# Patient Record
Sex: FEMALE | URBAN - METROPOLITAN AREA
[De-identification: names, ages, dates, MRNs, and addresses within clinical notes are randomized per-mention and may not be internally consistent; named-entity substitution may affect disease eponyms.]

---

## 2024-04-11 ENCOUNTER — ORDER TRANSCRIPTION (OUTPATIENT)
Dept: PHYSICAL THERAPY | Facility: HOSPITAL | Age: 59
End: 2024-04-11

## 2024-04-11 DIAGNOSIS — M54.30 SCIATICA: Primary | ICD-10-CM

## 2024-04-24 ENCOUNTER — TELEPHONE (OUTPATIENT)
Dept: PHYSICAL THERAPY | Facility: HOSPITAL | Age: 59
End: 2024-04-24

## 2024-04-29 ENCOUNTER — TELEPHONE (OUTPATIENT)
Dept: PHYSICAL THERAPY | Facility: HOSPITAL | Age: 59
End: 2024-04-29

## 2024-04-29 NOTE — PROGRESS NOTES
SPINE EVALUATION:     Diagnosis:   Sciatica (M54.30)      Referring Provider: Elsa Torres  Date of Evaluation:    4/29/2024    Precautions:  {PT_PRECAUTIONS:564::\"None\"} Next MD visit:   none scheduled  Date of Surgery: n/a     PATIENT SUMMARY   Rolly Mcnair is a 59 year old female who presents to therapy today with complaints of ***  Pt describes pain level current ***/10, at best ***/10, at worst ***/10.   Current functional limitations include ***.     Rolly describes prior level of function ***. Pt goals include ***.  Past medical history was reviewed with Rolly. Significant findings include  has no past medical history on file.    Pt denies diplopia, dysarthria, dysphasia, dizziness, drop attacks, bowel/bladder changes, saddle anesthesia, and JEANNIE LE N/T.    ASSESSMENT  Rolly presents to physical therapy evaluation with primary c/o ***. The results of the objective tests and measures show ***.  Functional deficits include but are not limited to ***.  Signs and symptoms are consistent with diagnosis of ***. Pt and PT discussed evaluation findings, pathology, POC and HEP.  Pt voiced understanding and performs HEP correctly without reported pain. Skilled Physical Therapy is medically necessary to address the above impairments and reach functional goals.     OBJECTIVE:   Observation/Posture: ***  Gait: ***coordinated gait  Neuro Screen: *** LE's intact to light touch all dermatomes    ***Denies changes in bowel/bladder function                Reflexes:  Patellar (L3-4)                     Achilles (S1-2)    Lumbar ROM: (degrees)  Flexion:          fingertips to  ***   Extension  ***      Right               Left   Sidebend  ***        ***     Rotation  ***       Hip Screen:    Hip ROM:  ER  R: *** deg L *** deg         IR   R: *** deg L *** deg   Hip Quadrant: flex/add/comp:  Functional:   Single Leg Stance Balance:  Right: *** sec   Left: *** sec   Standing squat: ***   Step Test: ***  Accessory  Motion: ***   Palpation: ***    Strength/MMT:       Right  Left   Hip flexion (L1,2,3)  ***/5  ***/5   Knee Extension (L3, L4)  ***/5  ***/5   Ankle DF/inversion (L4, L5)  ***/5  ***/5   Great Toe Extension (L5)  ***/5  ***/5   Ankle Eversion (S1)  ***/5  ***/5     Flexibility:   Hamstrings 90/90: R *** deg L *** deg   Piriformis: *** restiction  Special Tests:   SLR: R (***)  L (***)    Further Sensitization:  Dorsiflexion:       Internal Rotation:       Adduction:   Slump: R (***)   L (***)   Passive Neck Flexion Test: (***) for LBP or LE pain   Mona's: R (***);   L (***)   Lumbar Quadrant Standing (Ext, rot, SB)    Prone Instability Test:   Lateral Shift Test:   Calf Strength Test (S1, S2)  6x:   Lumbar Compression/Distraction Sitting:   Prone Knee Bend (L1,2,3,4 tension):   Prone Knee Bend in sidelying:    Cord Compression/MVC: Babinski                    Klonus 3x minimum   SI Provocation Tests: ***/6    Distraction:    Thigh Thrust:    Gaenslen's: R ***; L ***    Compression Sidelying:    Sacral Thrust:    (Palpation posterior SI ligament)   Ligamentous Laxity Scale: ***/9    Passive hyperextension elbow > 10 deg    Passive hyperextension 5th finger > 90 deg     Passive abduction of thumb to contact forearm    Passive knee hyperextension > 10 deg    Trunk flexion to hands flat on floor            Today’s Treatment and Response:   Pt education was provided on exam findings, treatment diagnosis, treatment plan, expectations, and prognosis. Pt was also provided recommendations for {pt/ot/slp education:8354}  Patient was instructed in and issued a HEP for: ***    Charges: PT Eval {LOW/MODERATE/HIGH COMPLEXITY:4314}, ***      Total Timed Treatment: *** min     Total Treatment Time: *** min     Based on clinical rationale and outcome measures, this evaluation involved {LOW/MODERATE/HIGH COMPLEXITY:5068} decision making due to {1-2, 3+:8580} personal factors/comorbidities, {3, 4+:7228} body structures  involved/activity limitations, and {Evolving/Unstable:7229} symptoms including {Vital sign response/changing pain levels:7230}.  PLAN OF CARE:    Goals: (to be met in *** visits)   (I) with HEP  Demonstrate proper body mechanics with functional squat and without c/o pain.  Improved lumbar flexion to enable don/doff socks and shoes without complaints.   Able to tolerate activities in *** with decreased pain to ***.  Able to sleep uninterrupted from pain.   Radicular symptoms abolished with all ADL's.  Pt to return to work without restriction.   Demonstrate proper body mechanics while lifting ***lbs floor to waist to help prevent re-injury.       Frequency / Duration: Patient will be seen for *** x/week or a total of *** visits over a 90 day period. Treatment will include: {Ortho Interventions:7069}    Education or treatment limitation: None  Rehab Potential:good    {Pre and Post Scores:65025}    Patient/Family/Caregiver was advised of these findings, precautions, and treatment options and has agreed to actively participate in planning and for this course of care.    Thank you for your referral. Please co-sign or sign and return this letter via fax as soon as possible to [phone]. If you have any questions, please contact me at Dept: [phone]    Sincerely,  Electronically signed by therapist: Elsa Bowman, PT    Physician's certification required: Yes  I certify the need for these services furnished under this plan of treatment and while under my care.    X___________________________________________________ Date____________________    Certification From: 4/29/2024  To:7/28/2024

## 2024-04-30 ENCOUNTER — APPOINTMENT (OUTPATIENT)
Dept: PHYSICAL THERAPY | Age: 59
End: 2024-04-30
Attending: FAMILY MEDICINE
Payer: COMMERCIAL

## 2024-05-09 ENCOUNTER — APPOINTMENT (OUTPATIENT)
Dept: PHYSICAL THERAPY | Age: 59
End: 2024-05-09
Attending: FAMILY MEDICINE
Payer: COMMERCIAL

## 2024-05-13 ENCOUNTER — APPOINTMENT (OUTPATIENT)
Dept: PHYSICAL THERAPY | Age: 59
End: 2024-05-13
Attending: FAMILY MEDICINE
Payer: COMMERCIAL

## 2024-05-15 ENCOUNTER — APPOINTMENT (OUTPATIENT)
Dept: PHYSICAL THERAPY | Age: 59
End: 2024-05-15
Attending: FAMILY MEDICINE
Payer: COMMERCIAL

## 2024-05-20 ENCOUNTER — APPOINTMENT (OUTPATIENT)
Dept: PHYSICAL THERAPY | Age: 59
End: 2024-05-20
Attending: FAMILY MEDICINE
Payer: COMMERCIAL

## 2024-11-07 ENCOUNTER — HOSPITAL ENCOUNTER (EMERGENCY)
Facility: HOSPITAL | Age: 59
Discharge: HOME | End: 2024-11-08
Attending: EMERGENCY MEDICINE
Payer: COMMERCIAL

## 2024-11-07 ENCOUNTER — APPOINTMENT (OUTPATIENT)
Dept: GENERAL RADIOLOGY | Facility: HOSPITAL | Age: 59
End: 2024-11-07
Payer: COMMERCIAL

## 2024-11-07 ENCOUNTER — APPOINTMENT (OUTPATIENT)
Dept: CT IMAGING | Facility: HOSPITAL | Age: 59
End: 2024-11-07
Attending: EMERGENCY MEDICINE
Payer: COMMERCIAL

## 2024-11-07 DIAGNOSIS — R19.7 NAUSEA VOMITING AND DIARRHEA: Primary | ICD-10-CM

## 2024-11-07 DIAGNOSIS — N28.9 LESION OF LEFT NATIVE KIDNEY: ICD-10-CM

## 2024-11-07 DIAGNOSIS — R11.2 NAUSEA VOMITING AND DIARRHEA: Primary | ICD-10-CM

## 2024-11-07 LAB
ALBUMIN SERPL-MCNC: 4.4 G/DL (ref 3.2–4.8)
ALBUMIN/GLOB SERPL: 1.2 {RATIO} (ref 1–2)
ALP LIVER SERPL-CCNC: 142 U/L
ALT SERPL-CCNC: 34 U/L
ANION GAP SERPL CALC-SCNC: 5 MMOL/L (ref 0–18)
AST SERPL-CCNC: 29 U/L (ref ?–34)
BASOPHILS # BLD AUTO: 0.07 X10(3) UL (ref 0–0.2)
BASOPHILS NFR BLD AUTO: 0.9 %
BILIRUB SERPL-MCNC: 0.2 MG/DL (ref 0.3–1.2)
BUN BLD-MCNC: 19 MG/DL (ref 9–23)
CALCIUM BLD-MCNC: 9.9 MG/DL (ref 8.7–10.4)
CHLORIDE SERPL-SCNC: 104 MMOL/L (ref 98–112)
CO2 SERPL-SCNC: 29 MMOL/L (ref 21–32)
CREAT BLD-MCNC: 1.09 MG/DL
EGFRCR SERPLBLD CKD-EPI 2021: 59 ML/MIN/1.73M2 (ref 60–?)
EOSINOPHIL # BLD AUTO: 0.26 X10(3) UL (ref 0–0.7)
EOSINOPHIL NFR BLD AUTO: 3.3 %
ERYTHROCYTE [DISTWIDTH] IN BLOOD BY AUTOMATED COUNT: 13.3 %
GLOBULIN PLAS-MCNC: 3.7 G/DL (ref 2–3.5)
GLUCOSE BLD-MCNC: 111 MG/DL (ref 70–99)
HCT VFR BLD AUTO: 36.2 %
HGB BLD-MCNC: 12.2 G/DL
IMM GRANULOCYTES # BLD AUTO: 0.03 X10(3) UL (ref 0–1)
IMM GRANULOCYTES NFR BLD: 0.4 %
LIPASE SERPL-CCNC: 117 U/L (ref 12–53)
LYMPHOCYTES # BLD AUTO: 3.09 X10(3) UL (ref 1–4)
LYMPHOCYTES NFR BLD AUTO: 39.1 %
MCH RBC QN AUTO: 29 PG (ref 26–34)
MCHC RBC AUTO-ENTMCNC: 33.7 G/DL (ref 31–37)
MCV RBC AUTO: 86.2 FL
MONOCYTES # BLD AUTO: 0.62 X10(3) UL (ref 0.1–1)
MONOCYTES NFR BLD AUTO: 7.8 %
NEUTROPHILS # BLD AUTO: 3.84 X10 (3) UL (ref 1.5–7.7)
NEUTROPHILS # BLD AUTO: 3.84 X10(3) UL (ref 1.5–7.7)
NEUTROPHILS NFR BLD AUTO: 48.5 %
OSMOLALITY SERPL CALC.SUM OF ELEC: 289 MOSM/KG (ref 275–295)
PLATELET # BLD AUTO: 315 10(3)UL (ref 150–450)
POTASSIUM SERPL-SCNC: 3.5 MMOL/L (ref 3.5–5.1)
PROT SERPL-MCNC: 8.1 G/DL (ref 5.7–8.2)
RBC # BLD AUTO: 4.2 X10(6)UL
SODIUM SERPL-SCNC: 138 MMOL/L (ref 136–145)
TROPONIN I SERPL HS-MCNC: <3 NG/L
WBC # BLD AUTO: 7.9 X10(3) UL (ref 4–11)

## 2024-11-07 PROCEDURE — 85025 COMPLETE CBC W/AUTO DIFF WBC: CPT | Performed by: EMERGENCY MEDICINE

## 2024-11-07 PROCEDURE — 84484 ASSAY OF TROPONIN QUANT: CPT | Performed by: EMERGENCY MEDICINE

## 2024-11-07 PROCEDURE — 83690 ASSAY OF LIPASE: CPT | Performed by: EMERGENCY MEDICINE

## 2024-11-07 PROCEDURE — 71046 X-RAY EXAM CHEST 2 VIEWS: CPT

## 2024-11-07 PROCEDURE — 96361 HYDRATE IV INFUSION ADD-ON: CPT

## 2024-11-07 PROCEDURE — 80053 COMPREHEN METABOLIC PANEL: CPT | Performed by: EMERGENCY MEDICINE

## 2024-11-07 PROCEDURE — 93005 ELECTROCARDIOGRAM TRACING: CPT

## 2024-11-07 PROCEDURE — 99285 EMERGENCY DEPT VISIT HI MDM: CPT

## 2024-11-07 PROCEDURE — 96374 THER/PROPH/DIAG INJ IV PUSH: CPT

## 2024-11-07 PROCEDURE — 93010 ELECTROCARDIOGRAM REPORT: CPT

## 2024-11-07 PROCEDURE — 74177 CT ABD & PELVIS W/CONTRAST: CPT | Performed by: EMERGENCY MEDICINE

## 2024-11-07 PROCEDURE — 96375 TX/PRO/DX INJ NEW DRUG ADDON: CPT

## 2024-11-07 RX ORDER — KETOROLAC TROMETHAMINE 15 MG/ML
15 INJECTION, SOLUTION INTRAMUSCULAR; INTRAVENOUS ONCE
Status: COMPLETED | OUTPATIENT
Start: 2024-11-07 | End: 2024-11-07

## 2024-11-07 RX ORDER — ONDANSETRON 2 MG/ML
4 INJECTION INTRAMUSCULAR; INTRAVENOUS ONCE
Status: COMPLETED | OUTPATIENT
Start: 2024-11-07 | End: 2024-11-07

## 2024-11-07 RX ORDER — TELMISARTAN AND HYDROCHLORTHIAZIDE 40; 12.5 MG/1; MG/1
1 TABLET ORAL DAILY
COMMUNITY

## 2024-11-07 RX ORDER — DAPAGLIFLOZIN 5 MG/1
5 TABLET, FILM COATED ORAL DAILY
COMMUNITY

## 2024-11-07 RX ORDER — EFAVIRENZ, LAMIVUDINE AND TENOFOVIR DISOPROXIL FUMARATE 400; 300; 300 MG/1; MG/1; MG/1
TABLET, FILM COATED ORAL NIGHTLY
COMMUNITY

## 2024-11-07 RX ORDER — ATORVASTATIN CALCIUM 10 MG/1
10 TABLET, FILM COATED ORAL NIGHTLY
COMMUNITY

## 2024-11-08 VITALS
RESPIRATION RATE: 16 BRPM | OXYGEN SATURATION: 99 % | SYSTOLIC BLOOD PRESSURE: 125 MMHG | BODY MASS INDEX: 25.97 KG/M2 | HEIGHT: 62 IN | HEART RATE: 72 BPM | TEMPERATURE: 98 F | WEIGHT: 141.13 LBS | DIASTOLIC BLOOD PRESSURE: 75 MMHG

## 2024-11-08 LAB
ATRIAL RATE: 85 BPM
P AXIS: 65 DEGREES
P-R INTERVAL: 150 MS
Q-T INTERVAL: 362 MS
QRS DURATION: 74 MS
QTC CALCULATION (BEZET): 430 MS
R AXIS: 54 DEGREES
T AXIS: 53 DEGREES
VENTRICULAR RATE: 85 BPM

## 2024-11-08 RX ORDER — ONDANSETRON 4 MG/1
4 TABLET, ORALLY DISINTEGRATING ORAL EVERY 4 HOURS PRN
Qty: 10 TABLET | Refills: 0 | Status: SHIPPED | OUTPATIENT
Start: 2024-11-08 | End: 2024-11-15

## 2024-11-08 RX ORDER — ONDANSETRON 4 MG/1
4 TABLET, ORALLY DISINTEGRATING ORAL EVERY 4 HOURS PRN
Qty: 10 TABLET | Refills: 0 | Status: SHIPPED | OUTPATIENT
Start: 2024-11-08 | End: 2024-11-08

## 2024-11-08 NOTE — ED INITIAL ASSESSMENT (HPI)
Pt here with c/o abd pain, more to the LLQ radiating up to the chest area for 4 days with n/v. She denies problems with bowel and bladder. Reports occas MAGGIE

## 2024-11-08 NOTE — ED PROVIDER NOTES
Patient Seen in: [facility] Emergency Department      History     Chief Complaint   Patient presents with    Abdomen/Flank Pain     Stated Complaint: chest pain and abd pain    Subjective:   HPI      This is a 59-year-old female past medical history of diabetes, hypertension who presents with abdominal pain lower quadrant abdominal pain for the last 4 to 5 days which describes as constant.  Is been associated with nausea, vomiting and diarrhea.  She has had about 2 episodes of vomiting and about 4 episodes of diarrhea today and 5 yesterday.  She states occasionally radiates up into the epigastric area and into her chest on the left side.  No sick contacts.  No fevers.  No recent travel.  No recent antibiotics.  She states she saw her primary care physician today who sent her to the emergency room.    Objective:     No pertinent past medical history.            No pertinent past surgical history.              No pertinent social history.                Physical Exam     ED Triage Vitals [11/07/24 2033]   /76   Pulse 83   Resp 20   Temp 98 °F (36.7 °C)   Temp src Temporal   SpO2 97 %   O2 Device None (Room air)       Current Vitals:   Vital Signs  BP: 125/75  Pulse: 72  Resp: 16  Temp: 98 °F (36.7 °C)  Temp src: Temporal  MAP (mmHg): 90    Oxygen Therapy  SpO2: 99 %  O2 Device: None (Room air)        Physical Exam  GENERAL: Awake, alert oriented x3, nontoxic appearing.   SKIN: Normal, warm, and dry.  HEENT:  Pupils equally round and reactive to light. Conjuctiva clear.  Oropharynx is clear and moist.   Lungs: Clear to auscultation bilaterally with no rales, no retractions, and no wheezing.  HEART:  Regular rate and rhythm. S1 and S2. No murmurs, no rubs or gallops.   ABDOMEN: Soft, nontender and nondistended. Normoactive bowel sounds. No rebound. No guarding.   EXTREMITIES: Warm with brisk capillary refill.       ED Course     Labs Reviewed   COMP METABOLIC PANEL (14) - Abnormal; Notable for the following  components:       Result Value    Glucose 111 (*)     Creatinine 1.09 (*)     eGFR-Cr 59 (*)     Alkaline Phosphatase 142 (*)     Bilirubin, Total 0.2 (*)     Globulin  3.7 (*)     All other components within normal limits   LIPASE - Abnormal; Notable for the following components:    Lipase 117 (*)     All other components within normal limits   TROPONIN I HIGH SENSITIVITY - Normal   CBC WITH DIFFERENTIAL WITH PLATELET   RAINBOW DRAW BLUE     EKG    Rate, intervals and axes as noted on EKG Report.  Rate: 85  Rhythm: Sinus Rhythm  Reading: No acute changes.                CT ABDOMEN+PELVIS(CONTRAST ONLY)(CPT=74177)    Result Date: 11/7/2024  PROCEDURE:  CT ABDOMEN+PELVIS (CONTRAST ONLY) (CPT=74177)  COMPARISON:  None.  INDICATIONS:  chest pain and abd pain  TECHNIQUE:  CT scanning was performed from the dome of the diaphragm to the pubic symphysis with non-ionic intravenous contrast material. Post contrast coronal MPR imaging was performed.  Dose reduction techniques were used. Dose information is transmitted to the ACR (American College of Radiology) NRDR (National Radiology Data Registry) which includes the Dose Index Registry.  PATIENT STATED HISTORY:(As transcribed by Technologist)  Left lower quadrant pain for 4 days   CONTRAST USED:  80cc of Isovue 370  FINDINGS:  LIVER:  No enlargement, atrophy, abnormal density, or significant focal lesion.  BILIARY:  No visible dilatation or calcification.  PANCREAS:  No lesion, fluid collection, ductal dilatation, or atrophy.  SPLEEN:  No enlargement or focal lesion.  KIDNEYS:  Indeterminate soft tissue attenuation 1.2 cm exophytic lesion within the left lower pole.  No hydronephrosis or nephrolithiasis bilaterally. ADRENALS:  No mass or enlargement.  AORTA/VASCULAR:  No aneurysm or dissection.  RETROPERITONEUM:  No mass or adenopathy.  BOWEL/MESENTERY:  No dilated bowel or wall thickening.  Appendix is not visualized. ABDOMINAL WALL:  No mass or hernia.  URINARY BLADDER:   No visible focal wall thickening, lesion, or calculus.  PELVIC NODES:  No adenopathy.  PELVIC ORGANS:  Hysterectomy BONES:  No bony lesion or fracture.  LUNG BASES:  No visible pulmonary or pleural disease.  OTHER:  Negative.             CONCLUSION:  1. No acute abnormality in the abdomen or pelvis. 2. Indeterminate 1.2 centimeters soft tissue attenuation left lower pole renal lesion.  This may represent a hemorrhagic/proteinaceous cyst though enhancing neoplasm is not excluded, further evaluation with contrast enhanced renal MRI is recommended.   LOCATION:  [facility]   Dictated by (CST): Dani Godoy MD on 11/07/2024 at 10:39 PM     Finalized by (CST): Dani Godoy MD on 11/07/2024 at 10:43 PM       XR CHEST PA + LAT CHEST (CPT=71046)    Result Date: 11/7/2024  PROCEDURE:  XR CHEST PA + LAT CHEST (CPT=71046)  INDICATIONS:  chest pain and abd pain  COMPARISON:  None.  TECHNIQUE:  PA and lateral chest radiographs were obtained.  PATIENT STATED HISTORY: (As transcribed by Technologist)  Patient states swelling and discomfort along left anterior and central anterior chest and left groin with shortness of breath x4 days.    FINDINGS:  LUNGS:  No focal consolidation.  Normal vascularity. CARDIAC:  Normal size cardiac silhouette. MEDIASTINUM:  Normal. PLEURA:  Normal.  No pleural effusions. BONES:  Mild degenerative changes of the spine.            CONCLUSION:  No acute cardiopulmonary findings.   LOCATION:  [facility]   Dictated by (CST): Dani Godoy MD on 11/07/2024 at 8:59 PM     Finalized by (CST): Dani Godoy MD on 11/07/2024 at 8:59 PM            MDM              This is a 59-year-old female past medical history of diabetes, hypertension who presents with abdominal pain lower quadrant abdominal pain for the last 4 to 5 days which describes as constant.  Differential includes viral diarrhea, colitis, diverticulitis.      Patient placed on cardiac monitor, continuous pulse oximetry and IV line was established of  normal saline.    Basic labs were obtained.  CBC: White blood cell count 7.9.  Hemoglobin 12.2.  Platelet 315.  CMP: BUN 19.  Creatinine 1.0.  Glucose 111.  Bicarb 29.  Lipase 117.  Troponin negative.    CT scan was obtained which demonstrated 1.2 cm soft tissue attenuation left lower pole renal lesion.  Could be hemorrhagic cyst cannot completely exclude neoplasm will need follow-up.    Independently viewed the chest x-ray showed no acute findings.  Also reviewed the radiology interpretation and agreement.    Findings were discussed with the patient.  She needs to follow-up with her primary care physician Dr. Torres.  She can get an outpatient MRI with urology follow-up if needed.    Patient be discharged on supportive care for vomiting diarrhea.  Zofran.  Advance diet as tolerated.  Return for any problems.  Patient was discharged home in good condition with her son.                Disposition and Plan     Clinical Impression:  1. Nausea vomiting and diarrhea    2. Lesion of left native kidney         Disposition:  Discharge  11/8/2024 12:15 am    Follow-up:  Elsa Torres MD  [address]  [address] [address]  [phone]    Follow up on 11/8/2024            Medications Prescribed:  Discharge Medication List as of 11/8/2024 12:16 AM        START taking these medications    Details   ondansetron 4 MG Oral Tablet Dispersible Take 1 tablet (4 mg total) by mouth every 4 (four) hours as needed for Nausea., Print, Disp-10 tablet, R-0                 Supplementary Documentation:

## 2024-11-08 NOTE — DISCHARGE INSTRUCTIONS
Advance diet as tolerated  Zofran for nausea  Rest  Return if worse  You need to follow-up with your primary care physician for evaluation of the left kidney lesion.  You will need an MRI as an outpatient for further evaluation this should be done in the next couple of weeks.  Follow-up with your primary care physician later today

## 2024-12-10 ENCOUNTER — OFFICE VISIT (OUTPATIENT)
Dept: SURGERY | Facility: CLINIC | Age: 59
End: 2024-12-10
Payer: COMMERCIAL

## 2024-12-10 DIAGNOSIS — N28.9 RENAL LESION: Primary | ICD-10-CM

## 2024-12-10 DIAGNOSIS — R82.90 URINE FINDING: ICD-10-CM

## 2024-12-10 LAB
APPEARANCE: CLEAR
BILIRUBIN: NEGATIVE
GLUCOSE (URINE DIPSTICK): 500 MG/DL
KETONES (URINE DIPSTICK): NEGATIVE MG/DL
MULTISTIX LOT#: ABNORMAL NUMERIC
NITRITE, URINE: NEGATIVE
OCCULT BLOOD: NEGATIVE
PH, URINE: 6 (ref 4.5–8)
PROTEIN (URINE DIPSTICK): NEGATIVE MG/DL
SPECIFIC GRAVITY: 1.01 (ref 1–1.03)
URINE-COLOR: YELLOW
UROBILINOGEN,SEMI-QN: 0.2 MG/DL (ref 0–1.9)

## 2024-12-10 PROCEDURE — 99203 OFFICE O/P NEW LOW 30 MIN: CPT | Performed by: PHYSICIAN ASSISTANT

## 2024-12-10 PROCEDURE — 81003 URINALYSIS AUTO W/O SCOPE: CPT | Performed by: PHYSICIAN ASSISTANT

## 2024-12-10 RX ORDER — ERGOCALCIFEROL 1.25 MG/1
50000 CAPSULE, LIQUID FILLED ORAL WEEKLY
COMMUNITY
Start: 2024-12-05

## 2024-12-10 RX ORDER — MELATONIN
1000 DAILY
COMMUNITY

## 2024-12-10 NOTE — PROGRESS NOTES
[facility], [facility]    Urology Consult Note    History of Present Illness:   Patient is a 59 year old female with hx of HTN, type II DM, who presents today for consultation from Dr. Louise's office for renal lesion.     Patient was seen at ED 11/8/24 for left lower abdominal pain with associated n/v/d. CT scan was obtained that showed no acute abnormalities but incidental 1.2 cm indeterminate left lower pole renal lesion. MRI was recommended.     The nausea/vomiting has resolved. She continues to have LLQ pain, more into the hip and it is worse with movements. Bowels regular. No voiding complaints, denies gross hematuria.    Non smoker  No chemical exposures  No FH of  cancers    HISTORY:  Past Medical History:    Diabetes (HCC)    Essential hypertension      No past surgical history on file.   No family history on file.   Social History:   Social History     Socioeconomic History    Marital status:    Tobacco Use    Smoking status: Never   Vaping Use    Vaping status: Never Used        Allergies  Allergies[1]    Review of Systems:   A 10-point review of systems was completed and is negative other than as noted above.    Physical Exam:   There were no vitals taken for this visit.    GENERAL APPEARANCE: well developed, well nourished, in no acute distress  NEUROLOGIC: no localizing neurologic signs, alert and oriented x 3, converses appropriately  HEAD: atraumatic, normocephalic  EYES: sclera non-icteric  ORAL CAVITY: mucosa moist  NECK/THYROID: no obvious masses or goiter  LUNGS: non-labored breathing  ABDOMEN: soft, nontender, nondistended  Left hip crest tender  EXTREMITIES: warm, well-perfused. No clubbing, cyanosis or edema.  SKIN: no obvious rashes    Results:     Laboratory Data:  Lab Results   Component Value Date    WBC 7.9 11/07/2024    HGB 12.2 11/07/2024    .0 11/07/2024     Lab Results   Component Value Date     11/07/2024    K 3.5 11/07/2024      11/07/2024    CO2 29.0 11/07/2024    BUN 19 11/07/2024     (H) 11/07/2024    AST 29 11/07/2024    ALT 34 11/07/2024    TP 8.1 11/07/2024    ALB 4.4 11/07/2024    CA 9.9 11/07/2024       Urinalysis Results (last three years):  Recent Labs     12/10/24  1211   SPECGRAVITY 1.010   PHURINE 6.0   NITRITE Negative       Urine Culture Results (last three years):  No results found for: \"URINECUL\"    Imaging  No results found.      Impression:     Patient is a 59 year old female  with hx of HTN, type II DM, who presents today for consultation from Dr. Louise's office for renal lesion.     Reviewed CT scan images with patient and daughter, reviewed limitation of imaging and recommendation to proceed with MRI. We discussed that this was an incidental finding and unrelated to her left hip pain. Recommend further evaluation as directed by PCP.    Recommendations:  MRI abdomen w+wo contrast.   Keep OV with Dr. Shaikh, pending results may be able to cancel.    Thank you very much for this consult. Please call if there are any questions or concerns.     Deb Cha PA-C  Urology  [facility]    Date: 12/10/2024          [1] No Known Allergies

## 2024-12-16 ENCOUNTER — LAB ENCOUNTER (OUTPATIENT)
Dept: LAB | Age: 59
End: 2024-12-16
Attending: INTERNAL MEDICINE
Payer: COMMERCIAL

## 2024-12-16 ENCOUNTER — OFFICE VISIT (OUTPATIENT)
Dept: RHEUMATOLOGY | Facility: CLINIC | Age: 59
End: 2024-12-16
Payer: COMMERCIAL

## 2024-12-16 VITALS
DIASTOLIC BLOOD PRESSURE: 70 MMHG | HEIGHT: 62 IN | TEMPERATURE: 98 F | SYSTOLIC BLOOD PRESSURE: 112 MMHG | WEIGHT: 139 LBS | OXYGEN SATURATION: 98 % | RESPIRATION RATE: 16 BRPM | BODY MASS INDEX: 25.58 KG/M2 | HEART RATE: 68 BPM

## 2024-12-16 DIAGNOSIS — M79.18 MYOFASCIAL PAIN DYSFUNCTION SYNDROME: ICD-10-CM

## 2024-12-16 DIAGNOSIS — G62.9 NEUROPATHY: ICD-10-CM

## 2024-12-16 DIAGNOSIS — R76.8 ANA POSITIVE: ICD-10-CM

## 2024-12-16 DIAGNOSIS — F40.8 OTHER PHOBIC ANXIETY DISORDERS: ICD-10-CM

## 2024-12-16 DIAGNOSIS — N28.9 RENAL INSUFFICIENCY: ICD-10-CM

## 2024-12-16 DIAGNOSIS — M15.0 PRIMARY GENERALIZED HYPERTROPHIC OSTEOARTHROSIS: Primary | ICD-10-CM

## 2024-12-16 DIAGNOSIS — M15.0 PRIMARY OSTEOARTHRITIS INVOLVING MULTIPLE JOINTS: ICD-10-CM

## 2024-12-16 DIAGNOSIS — M15.0 PRIMARY OSTEOARTHRITIS INVOLVING MULTIPLE JOINTS: Primary | ICD-10-CM

## 2024-12-16 PROBLEM — M15.9 OSTEOARTHRITIS OF MULTIPLE JOINTS: Status: ACTIVE | Noted: 2024-12-16

## 2024-12-16 LAB
BILIRUB UR QL STRIP.AUTO: NEGATIVE
CLARITY UR REFRACT.AUTO: CLEAR
CRP SERPL-MCNC: 0.6 MG/DL (ref ?–0.5)
ERYTHROCYTE [SEDIMENTATION RATE] IN BLOOD: 18 MM/HR
GLUCOSE UR STRIP.AUTO-MCNC: >1000 MG/DL
IGA SERPL-MCNC: 293.6 MG/DL (ref 40–350)
IGM SERPL-MCNC: 64.6 MG/DL (ref 50–300)
IMMUNOGLOBULIN PNL SER-MCNC: 1358 MG/DL (ref 650–1600)
KETONES UR STRIP.AUTO-MCNC: NEGATIVE MG/DL
LEUKOCYTE ESTERASE UR QL STRIP.AUTO: 25
NITRITE UR QL STRIP.AUTO: NEGATIVE
PH UR STRIP.AUTO: 6 [PH] (ref 5–8)
PROT UR STRIP.AUTO-MCNC: 20 MG/DL
RBC UR QL AUTO: NEGATIVE
SP GR UR STRIP.AUTO: 1.02 (ref 1–1.03)
THYROGLOB SERPL-MCNC: <15 U/ML (ref ?–60)
THYROPEROXIDASE AB SERPL-ACNC: 480 U/ML (ref ?–60)
URATE SERPL-MCNC: 5.9 MG/DL
UROBILINOGEN UR STRIP.AUTO-MCNC: NORMAL MG/DL
VIT B12 SERPL-MCNC: 431 PG/ML (ref 211–911)
VIT D+METAB SERPL-MCNC: 31.6 NG/ML (ref 30–100)

## 2024-12-16 PROCEDURE — 86037 ANCA TITER EACH ANTIBODY: CPT

## 2024-12-16 PROCEDURE — 84165 PROTEIN E-PHORESIS SERUM: CPT

## 2024-12-16 PROCEDURE — 82607 VITAMIN B-12: CPT | Performed by: INTERNAL MEDICINE

## 2024-12-16 PROCEDURE — 86376 MICROSOMAL ANTIBODY EACH: CPT | Performed by: INTERNAL MEDICINE

## 2024-12-16 PROCEDURE — 82784 ASSAY IGA/IGD/IGG/IGM EACH: CPT

## 2024-12-16 PROCEDURE — 86140 C-REACTIVE PROTEIN: CPT | Performed by: INTERNAL MEDICINE

## 2024-12-16 PROCEDURE — 84550 ASSAY OF BLOOD/URIC ACID: CPT | Performed by: INTERNAL MEDICINE

## 2024-12-16 PROCEDURE — 86334 IMMUNOFIX E-PHORESIS SERUM: CPT

## 2024-12-16 PROCEDURE — 86800 THYROGLOBULIN ANTIBODY: CPT | Performed by: INTERNAL MEDICINE

## 2024-12-16 PROCEDURE — 36415 COLL VENOUS BLD VENIPUNCTURE: CPT | Performed by: INTERNAL MEDICINE

## 2024-12-16 PROCEDURE — 85652 RBC SED RATE AUTOMATED: CPT | Performed by: INTERNAL MEDICINE

## 2024-12-16 PROCEDURE — 87086 URINE CULTURE/COLONY COUNT: CPT | Performed by: INTERNAL MEDICINE

## 2024-12-16 PROCEDURE — 81001 URINALYSIS AUTO W/SCOPE: CPT | Performed by: INTERNAL MEDICINE

## 2024-12-16 PROCEDURE — 82306 VITAMIN D 25 HYDROXY: CPT

## 2024-12-16 PROCEDURE — 83521 IG LIGHT CHAINS FREE EACH: CPT

## 2024-12-16 PROCEDURE — 84207 ASSAY OF VITAMIN B-6: CPT

## 2024-12-16 PROCEDURE — 83516 IMMUNOASSAY NONANTIBODY: CPT

## 2024-12-16 PROCEDURE — 87522 HEPATITIS C REVRS TRNSCRPJ: CPT

## 2024-12-16 NOTE — PROGRESS NOTES
[facility], [address] [place] [address]      Consult     Rolly Mcnair Patient Status:  No patient class for patient encounter    2/3/1965 MRN [id number]   Location [facility], [address] [place] [address] Attending No att. providers found   Hosp Day # 0 PCP Elsa Torres MD     Referring Provider: PCP    Reason for Consultation: Joint pain    Using [employer] interpretation with professional services in translation    Patient's daughter-in-law is also present to help interpretation as well    Component  Ref Range & Units 24  9:34 AM   REY SCREEN  Negative POSITIVE Abnormal    Comment: (NOTE)     REY TITER  Negative 1:80 Abnormal    Comment: (NOTE)     Ref Range & Units 24  9:34 AM   C REACTIVE PROTEIN  <8.1 MG/L 16.0 High      Component  Ref Range & Units 24  9:34 AM   SED RATE  0 - 35 MM 56 High    Comment:                     A new automated method for performing     Subjective:    Rolly Mcnair is a 59 year old female with comes in for further evaluation of joint pain 5-6 years ago started in knees and low back neck pain and shoulders.    Has chronic ashiness pain in shoulders (stiffness) swelling (burning)     Has some elbows , wrists or hands (ashiness and burning)     + knee pain; low back; hip pain    And occasional neck pain    Went to ER for abdominal pain and did CT abdomen/pelvis Left side showing possible cyst versus lesion of kidney recommended getting a MRI of the kidney from ER. Did not follow up with this yet with PCP or urologist and has ordered MRI abdomen/pelvis that is still pending    Has history of DM type 2 (Hba1c) not sure. But she states well controlled     Denies any history of DVT PE TIA CVA seizures     No history of migraines or headaches    Has some occasional shortness of breath  with mild to moderate exertion with no chest pain     No stress test test in past with primary doctor. EKG     No fevers ,chills    States no urinary or bowel  symptoms; bloody stools    Last colonscopy was 2021 (good)     Mammogram was not done yet    Regular PAP smear normal 1-2 years     States no  jaw pain, vision changes (age related changes)    States no history of pericardial, pleural effusions    States no significant dry eyes or dry mouth    States no history of uveitis iritis scleritis    States no history of Raynaud's or digital ulcerations    States no major weight changes; night sweats    Wt Readings from Last 6 Encounters:   12/16/24 139 lb (63 kg)   11/07/24 141 lb 1.5 oz (64 kg)     Her weight has been stable    States no history of photosensitive or malar rash.    States no history of psoriasis    Denies any depression +anxiety or+ insomnia (fatigue)     History/Other:      Past Medical History:  Past Medical History:    Diabetes (HCC)    Essential hypertension        Past Surgical History: History reviewed. No pertinent surgical history.    Social History:  reports that she has never smoked. She has never used smokeless tobacco. She reports that she does not drink alcohol and does not use drugs.    Family History: History reviewed. No pertinent family history.    Allergies: Allergies[1]    Current Medications:  Current Outpatient Medications   Medication Sig Dispense Refill    cyanocobalamin 1000 MCG Oral Tab Take 1 tablet (1,000 mcg total) by mouth daily.      ergocalciferol 1.25 MG (65553 UT) Oral Cap Take 1 capsule (50,000 Units total) by mouth once a week.      atorvastatin 10 MG Oral Tab Take 1 tablet (10 mg total) by mouth nightly.      metFORMIN 850 MG Oral Tab Take 1 tablet (850 mg total) by mouth 2 (two) times daily with meals.      dapagliflozin (FARXIGA) 5 MG Oral Tab Take 1 tablet (5 mg total) by mouth daily. For 3 months      Telmisartan-HCTZ 40-12.5 MG Oral Tab Take 1 tablet by mouth daily.      Efavirenz-lamiVUDine-Tenofovir 400-300-300 MG Oral Tab Take by mouth at bedtime.        No current facility-administered medications for this visit.      (Not in a hospital admission)      Review of Systems:     Constitutional: Negative for chills, , +fatigue, no fever and unexpected weight change.    HENT: Negative for congestion, and mouth sores.    Eyes: Negative for photophobia, pain, redness and visual disturbance.    Respiratory: Negative for apnea, cough, chest tightness, shortness of breath, wheezing and stridor.    Cardiovascular: Negative for chest pain, palpitations and leg swelling.    Gastrointestinal: Negative for abdominal distention, +abdominal pain, no blood in stool, constipation, diarrhea and nausea.    Endocrine: Negative.     Genitourinary: Negative for decreased urine volume, difficulty urinating, dyspareunia, dysuria, flank pain, and frequency.    Musculoskeletal: + arthralgias, no gait problem and joint swelling.    Skin: Negative for color change, pallor and rash. No raynauds or digital ulcerations no sclerodactly.    Allergic/Immunologic: Negative.    Neurological: Negative for dizziness, tremors, seizures, syncope, speech difficulty, weakness, light-headedness, +numbness and headaches.    Hematological: Does not bruise/bleed easily.    Psychiatric/Behavioral: Negative for confusion, decreased concentration, hallucinations, self-injury,+ sleep disturbance and no suicidal ideas or depression.    Objective:   Vitals:    12/16/24 1113   BP: 112/70   Pulse: 68   Resp: 16   Temp: 98.1 °F (36.7 °C)          Constitutional: is oriented to person, place, and time. Appears well-developed and well-nourished. No distress.    HEENT: Normocephalic; EOMI; no jvd; no LAD; no oral or nasal ulcers.     Eyes: Conjunctivae and EOM are normal. Pupils are equal, round, and reactive to light.     Neck: Normal range of motion. No thyromegaly present.    Cardiovascular: RRR, no murmurs.    Lungs: Clear, Bilateral air entry, no wheezes.    Abdominal: Soft.  Mild tenderness no rebound bowel sounds present vague pain right lower quadrant    Musculoskeletal:          Joint Exam 12/16/2024        Right  Left   Sternoclavicular   Tender   Tender   Elbow   Tender   Tender   Thoracic Spine   Tender      Lumbar Spine   Tender      Sacroiliac   Tender   Tender   Hip   Tender   Tender        Swollen: 0      Tender: 10          Right shoulder: Exhibits normal range of motion on abduction and internal rotation, no tenderness, no bony tenderness, no deformity, no laceration, no pain and no spasm.        Left shoulder: Exhibits normal range of motion on abduction and internal and external rotation.  no tenderness, no bony tenderness, no swelling, no effusion, no deformity, no pain, no spasm and normal strength.        Right elbow:  Exhibits normal range of motion, no swelling, no effusion and no deformity. No tenderness found. No medial epicondyle, no lateral epicondyle and no olecranon process tenderness noted. There are no contractures or tophi or nodules.        Left elbow:  Normal range of motion, no swelling, no effusion and no deformity. No medial epicondyle, no lateral epicondyle and no olecranon process tenderness noted. There are no contractures or tophi or nodules.        Right wrist:  Exhibits normal range of motion, no tenderness, no bony tenderness, no swelling, no effusion and no crepitus. Flexion and extension intact w/o limitation.        Left wrist: Exhibits normal range of motion, no tenderness, no bony tenderness, no swelling, no effusion, no crepitus and no deformity. Flexion and extension intact without limitation.        Right hip: Exhibits normal range of motion, normal strength, no tenderness, no bony tenderness, no swelling and no crepitus.        Right hand: No synovitis of MCP,PIP or DIP joints; no Bouchards or Heberden nodules noted;  strength: 100%. Mild squaring of cmc joint; pain        Left hand: No synovitis of MCP,PIP or DIP joints; no Bouchards or Heberden nodules noted;  strength: 100%.  Mild squaring first CMC joint        Left hip: Exhibits  normal range of motion, normal strength, no tenderness, no bony tenderness, No swelling and no crepitus.        Right knee: Exhibits normal range of motion, no swelling, no effusion, no ecchymosis, no deformity and no erythema. No tenderness found. No medial joint line, no lateral joint line, no MCL and no LCL tenderness noted. mild crepitation on flexion of knee and extension normal.        Left knee:  Exhibits normal range of motion, no swelling, no effusion, no ecchymosis and no erythema. No tenderness found. No medial joint line, no lateral joint line and no patellar tendon tenderness noted. mild crepitation on flexion of the knee. Extension intact and normal.        Right ankle: No swelling, no deformity. No tenderness. Dorsiflexion and plantar flexion intact without limitation in range of motion.        Left ankle: Exhibits no swelling. No tenderness. No lateral malleolus and no medial malleolus tenderness found. Achilles tendon normal. Achilles tendon exhibits no pain, no defect and normal Hanson's test results.  Dorsiflexion and plantar flexion intact without limitation in range of motion.        Cervical back: Exhibits normal range of motion, no tenderness, no bony tenderness, no swelling, no pain and +spasm.        Thoracic back: Exhibits normal range of motion, no tenderness, no bony tenderness and + spasm.        Lumbar back:  Exhibits normal range of motion, no tenderness, no bony tenderness, no pain and + spasm.  Able to touch fingers to floor; Schober's testing negative    No tenderness of the trochanteric bursa full range of motion of the hips internal/external rotation            Right foot: normal. There is normal range of motion, no tenderness, no bony tenderness, no crepitus and no laceration. There is no synovitis or tenderness of the MTP joints to palpation.  Bony enlargement of the MTP joints        Left foot: normal. There is normal range of motion, no tenderness, no bony tenderness and no  crepitus. There is no synovitis or tenderness of the MTP joints to palpation.  Enlargement of the first MTP joint    Lymphadenopathy: No submental, no submandibular, and no occipital adenopathy present, has no cervical adenopathy or axillary lympadenopathy.    Neurological: Alert and oriented. No focal motor or sensory abnormalities. Strength is 5/5 Upper Extremities/Lower Extremities proximally and distally.    Skin: Skin is warm, dry and intact.    Psychiatric: Normal behavior.    Results:    Labs:      Lab Results   Component Value Date    WBC 7.9 11/07/2024    RBC 4.20 11/07/2024    HGB 12.2 11/07/2024    HCT 36.2 11/07/2024    MCV 86.2 11/07/2024    MCH 29.0 11/07/2024    MCHC 33.7 11/07/2024    RDW 13.3 11/07/2024    .0 11/07/2024       No components found for: \"RELY\", \"NMET\", \"MYEL\", \"PROMY\", \"STAN\", \"ABSNEUTS\", \"ABSBANDS\", \"ABMM\", \"ABMY\", \"ABPM\", \"ABBL\"      Lab Results   Component Value Date     11/07/2024    K 3.5 11/07/2024    CO2 29.0 11/07/2024    BUN 19 11/07/2024    ALB 4.4 11/07/2024    AST 29 11/07/2024    ALT 34 11/07/2024     ef Range & Units 5/2/24  9:34 AM   RNP ANTIBODY  <5.0 U/mL 0.6   Comment: (NOTE)  INTERPRETATION: Negative   SM ANTIBODY  <7.0 U/mL <0.7   Comment: (NOTE)  INTERPRETATION: Negative   SSA ANTIBODY  <7.0 U/mL <0.4   Comment: (NOTE)  INTERPRETATION: Negative   SSB ANTIBODY  <7.0 U/mL 0.4   Comment: (NOTE)  INTERPRETATION: Negative   SCL-70 ANTIBODY  <7.0 U/mL 0.7   Comment: (NOTE)  INTERPRETATION: Negative   CAIO-1 IGG ANTIBODY  <7.0 U/mL <0.3   Comment: (NOTE)     Component  Ref Range & Units 5/2/24  9:34 AM   DNA AB CRITHIDIA IFA  NEGATIVE NEGATIVE     Component  Ref Range & Units 5/2/24  9:34 AM   COMPLEMENT C4  16 - 38 MG/DL 51 High    Resulting Agency [facility]        Component  Ref Range & Units 5/2/24  9:34 AM   RHEUMATOID FACTOR  <14 IU/ML <10     Component  Ref Range & Units 5/2/24  9:34 AM   ANTI-CCP, IGG  <5.0 U/ML <3.4     omponent  Ref Range & Units  5/2/24  9:34 AM   COMPLEMENT C3  79 - 152 MG/ High      No components found for: \"ESRWESTERGRN\"       No results found for: \"CRP\"      No results found for: \"COLOR\", \"CLARITY\", \"UROBILINOGEN\", \"YEAST\"  @LABRCNTIP(RF,B12)@      [unfilled]    Imaging:  CT ABDOMEN+PELVIS(CONTRAST ONLY)(CPT=74177)    Result Date: 11/7/2024  CONCLUSION:  1. No acute abnormality in the abdomen or pelvis. 2. Indeterminate 1.2 centimeters soft tissue attenuation left lower pole renal lesion.  This may represent a hemorrhagic/proteinaceous cyst though enhancing neoplasm is not excluded, further evaluation with contrast enhanced renal MRI is recommended.   LOCATION:  [facility]   Dictated by (CST): Dani Godoy MD on 11/07/2024 at 10:39 PM     Finalized by (CST): Dani Godoy MD on 11/07/2024 at 10:43 PM       XR CHEST PA + LAT CHEST (CPT=71046)    Result Date: 11/7/2024  CONCLUSION:  No acute cardiopulmonary findings.   LOCATION:  [facility]   Dictated by (CST): Dani Godoy MD on 11/07/2024 at 8:59 PM     Finalized by (CST): Dani Godoy MD on 11/07/2024 at 8:59 PM        EXAM: AP and lateral views of bilateral hips     DATE: 5/2/2024 10:11 AM     CLINICAL INDICATION: hip pain. R76.8: Other specified abnormal immunological   findings in serum   M25.59: Pain in other specified joint.     COMPARISON: None.     FINDINGS:     Bones: There is no acute/subacute fracture or dislocation.     Joints: Mild bilateral coxa profunda. Mild osteophytic lipping, no erosions. No   joint space narrowing.     Soft Tissues: There is no chondrocalcinosis.   indings in serum   M25.59: Pain in other specified joint     COMPARISON: None.     FINDINGS:     RIGHT:   Bones: There is no acute fracture or dislocation.   Joints: There is mild  medial  compartment joint space narrowing with medial and   patellofemoral osteophytosis.   Soft Tissues: There is no focal soft tissue swelling. No joint effusion. No   chondrocalcinosis.     LEFT:   Bones: There is no  acute fracture or dislocation.   Joints: There is mild  medial  compartment joint space narrowing with medial and   patellofemoral osteophytosis.   Soft Tissues: There is no focal soft tissue swelling. No joint effusion. No   chondrocalcinosis.   EXAM: Frontal and bilateral oblique views of the SI joints      DATE: 5/2/2024 10:11 AM    CLINICAL INDICATION: SI joint pain. R76.8: Other specified abnormal  immunological findings in serum  M25.59: Pain in other specified joint.    COMPARISON: None.    FINDINGS:    Bones:  There is no acute or subacute fracture.      Joints: Mild subchondral sclerosis and osteophytic lipping at the sacroiliac  joints. No erosions.   Limited survey of bilateral hip joints negative for  degenerative or erosive changes; negative femoral head osteonecrosis. Mild  lumbar spondylosis.    Alignment: No diastasis.  Procedure Note  EXAM: AP, lateral and mortise views of the left ankle     DATE: 5/2/2024 10:12 AM     CLINICAL INDICATION: bilateral ankle pain. R76.8: Other specified abnormal   immunological findings in serum   M25.59: Pain in other specified joint.     COMPARISON: None.     FINDINGS:     Bones:There is no acute fracture or dislocation.     Joints: The mortise is congruent. Joint spaces are without degenerative   proliferation. No erosions.     Soft Tissues: There is mild distal Achilles tendon thickening. There is no ankle   joint effusion.     There is posterior and plantar calcaneal enthesopathy.     Assessment & Plan:      59-year-old woman comes in for further evaluation for polyarthralgia with elevated ESR CRP low positive REY 1-80 and persistent on and off left lower quadrant abdominal pain with recent CT showing cystic hemorrhagic cyst?    Elevated ESR CRP without clear history concerning for inflammatory arthritis or connective tissue disease  Mild osteoarthritis multiple joints  Suspect myofascial pain syndrome with muscle spasms tightness nonrestorative sleep tender  points neuropathy    Reviewed recent imaging for concerns for hemorrhagic cystic structure of the kidney on the left side with pain in that area would suggest follow-up with urology PCP for further evaluation.  Was told that she needed to proceed with an MRI of the pelvis to further evaluate this which they have not scheduled yet  I do not suspect this is arthritic in nature she has full range of motion of the hips  X-ray of the pelvis and hips in May 2024 from previous rheumatologist unrevealing  Mild osteoarthritis on x-rays of the knees suggested quad strengthening continued weight loss exercise consider knee injections they are not interested    Also history of diabetes could be also neuropathy related to this consider stenosis arthritis of the spine will look into this as well update pelvic x-rays to see if there is any changes    Patient has renal insufficiency avoid NSAIDs they are not wanting long-term medications or even gabapentin continue Tylenol as needed    Will check hemoglobin A1c as well to assess degree of control of her diabetes they state they are she is watching her diet and losing weight and also exercising regularly this is followed by her PCP    If her autoimmune workup is unrevealing and x-rays were unrevealing we will monitor every 6 months for evolving connective tissue disease    I have discussed and stressed the importance of age-appropriate malignancy screening she has not had her mammogram.  She also has a cystic structure that they are concerned about that needs further evaluation with an MRI of the pelvis that was ordered by urology that they need to schedule    Her history is not concerning for PMR or GCA would not subject her to steroids in the setting of her diabetes as well and I do not see a need for this at this time    Also with shortness of breath with moderate exertion with her history of diabetes I would strongly suggest cardiac testing stress test through her PCP to further  evaluate this.  She stated EKG was normal recently    She states no shortness of breath or chest pain at this time    Look for other etiology of elevated ESR CRP at this time as stated above    Patient and patient's daughter-in-law agree with the plan interpretation services was used using [employer] patient's daughter-in-law also was helping with communication    Will place Ortho consultation in the meantime they change their mind for injections or interventions      Education and counseling provided to patient.  Instructed patient to call my office or seek medical attention immediately if symptoms worsen. Risks and side effects of medications and diagnosis discussed in detail and patient was given written information on new prescribed medications.    Return to clinic:  Return in about 6 months (around 6/16/2025).    Dede James MD  12/16/2024           [1] No Known Allergies

## 2024-12-16 NOTE — PATIENT INSTRUCTIONS
OSTEOARTHRITIS    Fast Facts    Though some of the joint changes are irreversible, most patients will not need joint replacement surgery.    OA symptoms (what you feel) can vary greatly among patients.    A rheumatologist can detect arthritis and prescribe the proper treatment. The goal of treatment in OA is to reduce pain and improve function.    Exercise is an important part of OA treatment, because it can decrease joint pain and improve function.    At present, there is no treatment that can reverse the damage of OA in the joints. Researchers are trying to find ways to slow or reverse this joint damage.    Osteoarthritis (also known as OA) is a common joint disease that most often affects middle-age to elderly people. It is commonly referred to as \"wear and tear\" of the joints, but we now know that OA is a disease of the entire joint, involving the cartilage, joint lining, ligaments, and bone. Although it is more common in older people, it is not really accurate to say that the joints are just \"wearing out.\" It is characterized by breakdown of the cartilage (the tissue that cushions the ends of the bones between joints), bony changes of the joints, deterioration of tendons and ligaments, and various degrees of inflammation of the joint lining (called the synovium).    This arthritis tends to occur in the hand joints, spine, hips, knees, and great toes. The lifetime risk of developing OA of the knee is about 46%, and the lifetime risk of developing OA of the hip is 25%, according to the [facility] Osteoarthritis Project, a long-term study from the [facility] and sponsored by the Centers for Disease Control and Prevention (often called the CDC) and the National Institutes of Health.    OA is a top cause of disability in older people. The goal of osteoarthritis treatment is to reduce pain and improve function. There is no cure for the disease, but some treatments attempt to slow disease  progression.         What is osteoarthritis?    OA is a frequently slowly progressive joint disease typically seen in middle-aged to elderly people. In osteoarthritis, the cartilage between the bones in the joint breaks down. This causes the affected bones to slowly get bigger. The joint cartilage often breaks down because of mechanical stress or biochemical changes within the body, causing the bone underneath to fail. OA can occur together with other types of arthritis, such as gout or rheumatoid arthritis.    OA tends to affect commonly used joints such as the hands and spine, and the weight-bearing joints such as the hips and knees. Symptoms include:    Joint pain and stiffness    Knobby swelling at the joint    Cracking or grinding noise with joint movement    Decreased function of the joint    How do you treat osteoarthritis?    There is no proven treatment yet that can reverse joint damage from OA. The goal of osteoarthritis treatment is to reduce pain and improve function of the affected joints. Most often, this is possible with a mixture of physical measures and drug therapy and, sometimes, surgery.    Physical measures: Weight loss and exercise are useful in OA. Excess weight puts stress on your knee joints and hips and low back. For every 10 pounds of weight you lose over 10 years, you can reduce the chance of developing knee OA by up to 50 percent. Exercise can improve your muscle strength, decrease joint pain and stiffness, and lower the chance of disability due to OA. Also helpful are support (\"assistive\") devices, such as orthotics or a walking cane, that help you do daily activities. Heat or cold therapy can help relieve OA symptoms for a short time.    Certain alternative treatments such as spa (hot tub), massage, and chiropractic manipulation can help relieve pain for a short time. They can be costly, though, and require repeated treatments. Also, the long-term benefits of these alternative  (sometimes called complementary or integrative) medicine treatments are unproven but are under study.    Drug therapy: Forms of drug therapy include topical, oral (by mouth) and injections (shots). You apply topical drugs directly on the skin over the affected joints. These medicines include capsaicin cream, lidocaine and diclofenac gel. Oral pain relievers such as acetaminophen are common first treatments. So are nonsteroidal anti-inflammatory drugs (often called NSAIDs), which decrease swelling and pain.    In 2010, the government (FDA) approved the use of duloxetine (Cymbalta) for chronic (long-term) musculoskeletal pain including from OA. This oral drug is not new. It also is in use for other health concerns, such as mood disorders, nerve pain and fibromyalgia.    Patients with more serious pain may need stronger medications, such as prescription narcotics.    Joint injections with corticosteroids (sometimes called cortisone shots) or with a form of lubricant called hyaluronic acid can give months of pain relief from OA. This lubricant is given in the knee, and these shots may help delay the need for a knee replacement by a few years in some patients.    Surgery: Surgical treatment becomes an option for severe cases. This includes when the joint has serious damage, or when medical treatment fails to relieve pain and you have major loss of function. Surgery may involve arthroscopy, repair of the joint done through small incisions (cuts). If the joint damage cannot be repaired, you may need a joint replacement.    Supplements: Many over-the-counter nutrition supplements have been used for osteoarthritis treatment. Most lack good research data to support their effectiveness and safety. Among the most widely used are calcium, vitamin D and omega-3 fatty acids. To ensure safety and avoid drug interactions, consult your doctor or pharmacist before using any of these supplements. This is especially true when you are  combining these supplements with prescribed     Overview    Myofascial pain syndrome is a chronic pain disorder. In this condition, pressure on sensitive points in your muscles (trigger points) causes pain in the muscle and sometimes in seemingly unrelated parts of your body. This is called referred pain.    This syndrome typically occurs after a muscle has been contracted repetitively. This can be caused by repetitive motions used in jobs or hobbies or by stress-related muscle tension.    While nearly everyone has experienced muscle tension pain, the discomfort associated with myofascial pain syndrome persists or worsens. Treatment options include physical therapy and trigger point injections. Pain medications and relaxation techniques also can help.         Symptoms    Signs and symptoms of myofascial pain syndrome may include:    Deep, aching pain in a muscle    Pain that persists or worsens    A tender knot in a muscle    Difficulty sleeping due to pain    When to see a doctor    Make an appointment with your doctor if you experience muscle pain that doesn't go away. Nearly everyone experiences muscle pain from time to time. But if your muscle pain persists despite rest, massage and similar self-care measures, make an appointment with your doctor.    Causes    Sensitive areas of tight muscle fibers can form in your muscles after injuries or overuse. These sensitive areas are called trigger points. A trigger point in a muscle can cause strain and pain throughout the muscle. When this pain persists and worsens, doctors call it myofascial pain syndrome.    Myofascial pain syndrome is caused by a stimulus, such as muscle tightness, that sets off trigger points in your muscles. Factors that may increase your risk of muscle trigger points include:    Muscle injury. An acute muscle injury or continual muscle stress may lead to the development of trigger points. For example, a spot within or near a strained muscle may  become a trigger point. Repetitive motions and poor posture also may increase your risk.    Stress and anxiety. People who frequently experience stress and anxiety may be more likely to develop trigger points in their muscles. One theory holds that these people may be more likely to clench their muscles, a form of repeated strain that leaves muscles susceptible to trigger points.    Complications    Complications associated with myofascial pain syndrome may include:    Sleep problems. Signs and symptoms of myofascial pain syndrome may make it difficult to sleep at night. You may have trouble finding a comfortable sleep position. And if you move at night, you might hit a trigger point and awaken.    Fibromyalgia. Some research suggests that myofascial pain syndrome may develop into fibromyalgia in some people. Fibromyalgia is a chronic condition that features widespread pain. It's believed that the brains of people with fibromyalgia become more sensitive to pain signals over time. Some doctors believe myofascial pain syndrome may play a role in starting this process.      Positive REY    Fast Facts    Autoimmune diseases can be treated.    A positive REY test means autoantibodies are present. By itself, a positive REY test does not indicate the presence of an autoimmune disease or the need for therapy.    Some medications cause a positive REY. Tell your doctor all prescription, over-the-counter, and street drugs you take.    REY testing can produce a positive result without any actual disease process. This typically signals the presence of antinuclear antibodies in a healthy individual.    Talk to your doctor about a positive REY and best next steps for further evaluation.    The immune system makes an abundance of proteins called antibodies. Antibodies are made by white blood cells (B cells). The antibodies recognize and combat infectious organisms (germs) in the body. Antibodies develop in our immune system to help  the body fight infectious organisms. When an antibody recognizes the foreign proteins of an infectious organism, it recruits other proteins and cells to fight off the infection. This cascade of attack is called inflammation.    Sometimes these antibodies make a mistake, identifying normal, naturally-occurring proteins in our bodies as being \"foreign\" and dangerous. When these antibodies make incorrect calls, identifying a naturally-occurring protein (or self protein) as foreign, they are called autoantibodies. Autoantibodies start the cascade of inflammation, causing the body to attack itself. The antibodies that target \"normal\" proteins within the nucleus of a cell are called antinuclear antibodies (REY). Most of us have autoantibodies, but typically in small amounts. The presence of large amount of autoantibodies or ANAs can indicate an autoimmune disease. ANAs could signal the body to begin attacking itself which can lead to autoimmune diseases, including lupus, scleroderma, Sjögren's syndrome, polymyositis/dermatomyositis, mixed connective tissue disease, drug-induced lupus, and autoimmune hepatitis. A positive REY can also be seen in juvenile arthritis.         A negative REY reading means no autoantibodies are present in the body. However, a positive REY reading alone does not indicate an autoimmune disease.    The prevalence of ANAs in healthy individuals is about 3 - 15%. The production of these autoantibodies is strongly age-dependent, and increases to 10 - 37% in healthy persons over the age of 65. Even healthy people with viral infections can have a positive REY, albeit for a short time.    Some medications can cause a positive REY. It is important to talk with your doctor about all the drugs you are taking - prescription, over-the-counter, and street.    Other conditions, such as cancer, can cause a positive REY.    The positive REY reading simply tells your doctor to keep looking. In fact, you may have  a positive REY without any disease process which means that the evidence is not there to make a diagnosis of lupus or any other autoimmune disease. To make a definite diagnosis, your doctor will need more blood tests along with history of your symptoms and a physical examination.         How should I handle a positive REY reading?    Your rheumatologist will interpret your REY in the context of other laboratory studies and your clinical history, including family history. Remember, a single positive REY does not imply autoimmune disease, nor does a positive REY require immediate treatment. Lab levels vary; some autoantibodies are normal and this result may not indicate a problem.    Your rheumatologist will determine what happens next based on additional exploration. By working with your doctor and asking questions you will get the best care for your particular situation. Keep in mind, even if your REY reading does lead to an autoimmune diagnosis, there are treatments for autoimmune diseases.

## 2024-12-18 LAB
ANTI-MPO ANTIBODIES: <0.2 UNITS
ANTI-PR3 ANTIBODIES: <0.2 UNITS

## 2024-12-20 ENCOUNTER — HOSPITAL ENCOUNTER (OUTPATIENT)
Dept: ULTRASOUND IMAGING | Age: 59
Discharge: HOME | End: 2024-12-20
Attending: FAMILY MEDICINE
Payer: COMMERCIAL

## 2024-12-20 ENCOUNTER — HOSPITAL ENCOUNTER (OUTPATIENT)
Dept: GENERAL RADIOLOGY | Age: 59
Discharge: HOME | End: 2024-12-20
Attending: INTERNAL MEDICINE
Payer: COMMERCIAL

## 2024-12-20 ENCOUNTER — HOSPITAL ENCOUNTER (OUTPATIENT)
Dept: GENERAL RADIOLOGY | Age: 59
Discharge: HOME | End: 2024-12-20
Attending: FAMILY MEDICINE
Payer: COMMERCIAL

## 2024-12-20 ENCOUNTER — APPOINTMENT (OUTPATIENT)
Dept: GENERAL RADIOLOGY | Age: 59
End: 2024-12-20
Attending: INTERNAL MEDICINE
Payer: COMMERCIAL

## 2024-12-20 DIAGNOSIS — M79.18 MYOFASCIAL PAIN DYSFUNCTION SYNDROME: ICD-10-CM

## 2024-12-20 DIAGNOSIS — R42 DIZZINESS: ICD-10-CM

## 2024-12-20 DIAGNOSIS — R10.32 ABDOMINAL PAIN, LEFT LOWER QUADRANT: ICD-10-CM

## 2024-12-20 DIAGNOSIS — I10 HTN (HYPERTENSION): ICD-10-CM

## 2024-12-20 DIAGNOSIS — R07.9 CHEST PAIN: ICD-10-CM

## 2024-12-20 DIAGNOSIS — M15.0 PRIMARY OSTEOARTHRITIS INVOLVING MULTIPLE JOINTS: ICD-10-CM

## 2024-12-20 DIAGNOSIS — E78.5 HYPERLIPIDEMIA: ICD-10-CM

## 2024-12-20 DIAGNOSIS — F40.8 OTHER PHOBIC ANXIETY DISORDERS: ICD-10-CM

## 2024-12-20 DIAGNOSIS — G62.9 NEUROPATHY: ICD-10-CM

## 2024-12-20 DIAGNOSIS — R06.02 SOB (SHORTNESS OF BREATH): ICD-10-CM

## 2024-12-20 LAB
ALBUMIN SERPL ELPH-MCNC: 4.08 G/DL (ref 3.75–5.21)
ALBUMIN/GLOB SERPL: 1.3 {RATIO} (ref 1–2)
ALPHA1 GLOB SERPL ELPH-MCNC: 0.27 G/DL (ref 0.19–0.46)
ALPHA2 GLOB SERPL ELPH-MCNC: 0.71 G/DL (ref 0.48–1.05)
B-GLOBULIN SERPL ELPH-MCNC: 0.91 G/DL (ref 0.68–1.23)
GAMMA GLOB SERPL ELPH-MCNC: 1.24 G/DL (ref 0.62–1.7)
KAPPA LC FREE SER-MCNC: 3.23 MG/DL (ref 0.33–1.94)
KAPPA LC FREE/LAMBDA FREE SER NEPH: 1.23 {RATIO} (ref 0.26–1.65)
LAMBDA LC FREE SERPL-MCNC: 2.62 MG/DL (ref 0.57–2.63)
PROT SERPL-MCNC: 7.2 G/DL (ref 5.7–8.2)

## 2024-12-20 PROCEDURE — 72040 X-RAY EXAM NECK SPINE 2-3 VW: CPT | Performed by: INTERNAL MEDICINE

## 2024-12-20 PROCEDURE — 72110 X-RAY EXAM L-2 SPINE 4/>VWS: CPT | Performed by: INTERNAL MEDICINE

## 2024-12-20 PROCEDURE — 72190 X-RAY EXAM OF PELVIS: CPT | Performed by: INTERNAL MEDICINE

## 2024-12-20 PROCEDURE — 72072 X-RAY EXAM THORAC SPINE 3VWS: CPT | Performed by: INTERNAL MEDICINE

## 2024-12-20 PROCEDURE — 93880 EXTRACRANIAL BILAT STUDY: CPT | Performed by: FAMILY MEDICINE

## 2024-12-20 PROCEDURE — 73502 X-RAY EXAM HIP UNI 2-3 VIEWS: CPT | Performed by: FAMILY MEDICINE

## 2024-12-23 ENCOUNTER — HOSPITAL ENCOUNTER (OUTPATIENT)
Dept: MAMMOGRAPHY | Age: 59
Discharge: HOME | End: 2024-12-23
Attending: FAMILY MEDICINE
Payer: COMMERCIAL

## 2024-12-23 DIAGNOSIS — Z12.31 ENCOUNTER FOR SCREENING MAMMOGRAM FOR MALIGNANT NEOPLASM OF BREAST: ICD-10-CM

## 2024-12-23 LAB — VITAMIN B6: 5.5 UG/L

## 2024-12-23 PROCEDURE — 77063 BREAST TOMOSYNTHESIS BI: CPT | Performed by: FAMILY MEDICINE

## 2024-12-23 PROCEDURE — 77067 SCR MAMMO BI INCL CAD: CPT | Performed by: FAMILY MEDICINE

## 2024-12-26 ENCOUNTER — TELEPHONE (OUTPATIENT)
Dept: RHEUMATOLOGY | Facility: CLINIC | Age: 59
End: 2024-12-26

## 2024-12-26 DIAGNOSIS — M15.0 PRIMARY OSTEOARTHRITIS INVOLVING MULTIPLE JOINTS: Primary | ICD-10-CM

## 2024-12-26 DIAGNOSIS — M79.18 MYOFASCIAL PAIN DYSFUNCTION SYNDROME: ICD-10-CM

## 2024-12-26 DIAGNOSIS — G62.9 NEUROPATHY: ICD-10-CM

## 2024-12-26 NOTE — TELEPHONE ENCOUNTER
Reviewed test results with pt son. \"X rays show mild changes of osteoarthritis  Crp is slightly elevated  Esr normal  Other testing looks great  Suspect most of her pain in nerve pain related to Diabetes  If back and neck pain is concerning  We can refer to pain management and send to PT  Let me know if she would like to proceed  Otherwise suspicion is low for anthhing autoimmune  Her thyroid antibody is positive likely reason of her REY being false positive (low)  With her thyroid level being normal nothing to be concerned about at this time\"     Voiced understanding, requesting PT at [place] location. Referral pended

## 2024-12-26 NOTE — TELEPHONE ENCOUNTER
X rays show mild changes of osteoarthritis  Crp is slightly elevated  Esr normal  Other testing looks great  Suspect most of her pain in nerve pain related to Diabetes  If back and neck pain is concerning  We can refer to pain management and send to PT  Let me know if she would like to proceed  Otherwise suspicion is low for anthhing autoimmune  Her thyroid antibody is positive likely reason of her REY being false positive (low)  With her thyroid level being normal nothing to be concerned about at this time

## 2024-12-31 ENCOUNTER — HOSPITAL ENCOUNTER (OUTPATIENT)
Dept: CV DIAGNOSTICS | Facility: HOSPITAL | Age: 59
Discharge: HOME | End: 2024-12-31
Attending: FAMILY MEDICINE
Payer: COMMERCIAL

## 2024-12-31 DIAGNOSIS — R42 DIZZINESS: ICD-10-CM

## 2024-12-31 DIAGNOSIS — R07.9 CHEST PAIN: ICD-10-CM

## 2024-12-31 DIAGNOSIS — E78.5 HYPERLIPIDEMIA: ICD-10-CM

## 2024-12-31 DIAGNOSIS — R06.02 SOB (SHORTNESS OF BREATH): ICD-10-CM

## 2024-12-31 DIAGNOSIS — I10 HTN (HYPERTENSION): ICD-10-CM

## 2024-12-31 DIAGNOSIS — R10.32 ABDOMINAL PAIN, LEFT LOWER QUADRANT: ICD-10-CM

## 2024-12-31 PROCEDURE — 93306 TTE W/DOPPLER COMPLETE: CPT | Performed by: FAMILY MEDICINE

## 2024-12-31 PROCEDURE — 93017 CV STRESS TEST TRACING ONLY: CPT | Performed by: FAMILY MEDICINE

## 2024-12-31 PROCEDURE — 93018 CV STRESS TEST I&R ONLY: CPT | Performed by: FAMILY MEDICINE

## 2024-12-31 PROCEDURE — 78452 HT MUSCLE IMAGE SPECT MULT: CPT | Performed by: FAMILY MEDICINE

## 2024-12-31 RX ORDER — REGADENOSON 0.08 MG/ML
INJECTION, SOLUTION INTRAVENOUS
Status: COMPLETED
Start: 2024-12-31 | End: 2024-12-31

## 2024-12-31 RX ADMIN — REGADENOSON 0.4 MG: 0.08 INJECTION, SOLUTION INTRAVENOUS at 08:45:00

## 2024-12-31 NOTE — PROGRESS NOTES
CARDIODIAGNOSTIC PRELIMINARY REPORT:    Patient declined use of  Line; with assistance of her daughter -in-law, Celina,  as the [facility] , Lexiscan was completed; tolerated well    Second set of images pending

## 2025-01-24 ENCOUNTER — TELEPHONE (OUTPATIENT)
Dept: SURGERY | Facility: CLINIC | Age: 60
End: 2025-01-24

## 2025-01-24 NOTE — TELEPHONE ENCOUNTER
Pt called.  Scheduled for mri today 1-24-25 at 12:45 pm.  Advised the mri needs prior authorization from the insurance.  Please call pt

## 2025-01-24 NOTE — TELEPHONE ENCOUNTER
This encounter is now closed.     RN called patient. [facility] MRI authorization shows that it is still pending. \"Insurance will not cover without approval.\"   Per son, Luther, it is already approved and he spoke to  and gave them of the reference number  RN instructed to proceed with MRI if he has the approval.       Referral  Referral # [id number]  Referral Notes  Number of Notes: 6  .  Type Date User Summary Attachment    01/23/2025  1:29 PM Pat Irving Spoke to patients son as with the new insurance the authorization process will start again.  Stated Verbal understanding. -   Note:  Spoke to patients son as with the new insurance the authorization process will start again.  Stated Verbal understanding.     Status Of Auth is PENDING  Attempted to reach out to patient by phone and/or Mychart.  Insurance will not cover without approval.  Patient should reschedule.

## 2025-01-27 ENCOUNTER — OFFICE VISIT (OUTPATIENT)
Dept: SURGERY | Facility: CLINIC | Age: 60
End: 2025-01-27
Payer: COMMERCIAL

## 2025-01-27 ENCOUNTER — HOSPITAL ENCOUNTER (OUTPATIENT)
Dept: MRI IMAGING | Age: 60
Discharge: HOME | End: 2025-01-27
Attending: PHYSICIAN ASSISTANT
Payer: COMMERCIAL

## 2025-01-27 DIAGNOSIS — N28.9 RENAL LESION: ICD-10-CM

## 2025-01-27 DIAGNOSIS — N28.1 RENAL CYST: Primary | ICD-10-CM

## 2025-01-27 LAB
APPEARANCE: CLEAR
BILIRUBIN: NEGATIVE
GLUCOSE (URINE DIPSTICK): 500 MG/DL
KETONES (URINE DIPSTICK): NEGATIVE MG/DL
MULTISTIX LOT#: ABNORMAL NUMERIC
NITRITE, URINE: NEGATIVE
OCCULT BLOOD: NEGATIVE
PH, URINE: 6 (ref 4.5–8)
PROTEIN (URINE DIPSTICK): 30 MG/DL
SPECIFIC GRAVITY: 1.01 (ref 1–1.03)
URINE-COLOR: YELLOW
UROBILINOGEN,SEMI-QN: 0.2 MG/DL (ref 0–1.9)

## 2025-01-27 PROCEDURE — 74183 MRI ABD W/O CNTR FLWD CNTR: CPT | Performed by: PHYSICIAN ASSISTANT

## 2025-01-27 PROCEDURE — A9575 INJ GADOTERATE MEGLUMI 0.1ML: HCPCS | Performed by: PHYSICIAN ASSISTANT

## 2025-01-27 RX ORDER — GADOTERATE MEGLUMINE 376.9 MG/ML
12 INJECTION INTRAVENOUS
Status: COMPLETED | OUTPATIENT
Start: 2025-01-27 | End: 2025-01-27

## 2025-01-27 RX ADMIN — GADOTERATE MEGLUMINE 12 ML: 376.9 INJECTION INTRAVENOUS at 10:19:00

## 2025-01-27 NOTE — PROGRESS NOTES
Urology Clinic Note    Referring Provider:  No referring provider defined for this encounter.     Primary Care Provider:  Elsa Torres MD     Chief Complaint:   Renal cyst    HPI & Assessment:   Rolly Mcnair is a 59 year old female with history of HTN, DM referred for renal lesion.  Presented to ED on 11/8/2024 with left lower abdominal pain, nausea, vomiting, diarrhea.  CT showed an incidentally found 1.2 cm indeterminate left lower pole renal lesion.  She was seen by JASMIN Connors on 12/10/2024.  No smoking or chemical exposure history.  No family history of cancers.    MRI today showed left posterolateral, 50% exophytic 1.1 cm Bosniak 2F renal cyst, likely hemorrhagic cyst.    She continues to have left lower quadrant abdominal pain, but I discussed that this is very unlikely to be related to the kidney or small renal cyst.    Bosniak I: these are simple, benign cysts with a 0-2% risk of malignancy. These cysts are hairline-thin walled, without septations, calcifications or solid components. They are the same density as water, with no enhancement with contrast. There is no need to follow these cysts.    Bosniak II: these are also benign cysts with a 0-5% chance of malignancy. These may contain a few hairline thin septa, fine calcifications in the cyst wall or septations, and are uniformly high-attenuated lesions <3 cm. These cysts have sharp margins without contrast enhancement and there is no need for follow-up imaging.    Bosniak IIF: these are low risk cysts with a 17-25% risk of malignancy and more hairline-thin septa than Bosniak II cysts. They have minimal enhancement, minimal thickening, and calcifications. There is no enhancing soft tissue and are totally intrarenal and well marginated non-enhancing high attenuation lesions >=3 cm in size. These cysts are observed and followed expectantly.    Bosniak III: these are indeterminate cysts with a 30-54% risk of malignancy with thickened  irregular walls or septa. These cysts do enhance and the recommendation is for surgical excision.    Bosniak IV: these are malignant cysts with % risk of malignancy. They have enhancing soft-tissue components and the recommendation is for surgical excision.    Plan:   -Repeat abdominal MRI in 6 months  -If renal cyst stable at that time we will get renal ultrasound in 1 year  -RTC 6 months       History:     Past Medical History:    Diabetes (HCC)    Essential hypertension       Past Surgical History:   Procedure Laterality Date    Hysterectomy         No family history on file.    Social History     Socioeconomic History    Marital status:    Tobacco Use    Smoking status: Never    Smokeless tobacco: Never   Vaping Use    Vaping status: Never Used   Substance and Sexual Activity    Alcohol use: Never    Drug use: Never       Medications (Active prior to today's visit):  Current Outpatient Medications   Medication Sig Dispense Refill    cyanocobalamin 1000 MCG Oral Tab Take 1 tablet (1,000 mcg total) by mouth daily.      ergocalciferol 1.25 MG (57709 UT) Oral Cap Take 1 capsule (50,000 Units total) by mouth once a week.      atorvastatin 10 MG Oral Tab Take 1 tablet (10 mg total) by mouth nightly.      metFORMIN 850 MG Oral Tab Take 1 tablet (850 mg total) by mouth 2 (two) times daily with meals.      dapagliflozin (FARXIGA) 5 MG Oral Tab Take 1 tablet (5 mg total) by mouth daily. For 3 months      Telmisartan-HCTZ 40-12.5 MG Oral Tab Take 1 tablet by mouth daily.      Efavirenz-lamiVUDine-Tenofovir 400-300-300 MG Oral Tab Take by mouth at bedtime.         Allergies:  Allergies[1]      Review of Systems:   A comprehensive 10-point review of systems was completed.  Pertinent positives and negatives are noted in the the HPI.    Physical Exam:   CONSTITUTIONAL: Well developed, well nourished, in no acute distress  NEUROLOGIC: Alert and oriented  HEAD: Normocephalic, atraumatic  EYES: Sclera  non-icteric  ENT: Hearing intact, moist mucous membranes  NECK: No obvious goiter or masses  RESPIRATORY: Normal respiratory effort  SKIN: No evident rashes  ABDOMEN: Soft, non-tender, non-distended    Thank you for this consult.    I have personally reviewed all relevant medical records, labs, and imaging.    In total, 30 minutes were spent on this patient encounter (including chart review, patient history, physical, and counseling, documentation, and communication).    Efe Shaikh MD  Staff Urologist  [facility]  Office: [phone]           [1] No Known Allergies

## 2025-02-28 ENCOUNTER — TELEPHONE (OUTPATIENT)
Facility: CLINIC | Age: 60
End: 2025-02-28

## 2025-02-28 NOTE — TELEPHONE ENCOUNTER
Future Appointments   Date Time Provider Department Center   3/3/2025  2:00 PM Irving Suresh MD [facility] [address]   6/16/2025 12:20 PM Dede James MD [id number] [address]     Does patient need left hip xrays.

## 2025-03-03 ENCOUNTER — OFFICE VISIT (OUTPATIENT)
Facility: CLINIC | Age: 60
End: 2025-03-03
Payer: COMMERCIAL

## 2025-03-03 VITALS — BODY MASS INDEX: 25.58 KG/M2 | HEIGHT: 62 IN | WEIGHT: 139 LBS

## 2025-03-03 DIAGNOSIS — M25.552 LEFT HIP PAIN: Primary | ICD-10-CM

## 2025-03-03 PROCEDURE — 99203 OFFICE O/P NEW LOW 30 MIN: CPT | Performed by: STUDENT IN AN ORGANIZED HEALTH CARE EDUCATION/TRAINING PROGRAM

## 2025-03-03 NOTE — PROGRESS NOTES
Orthopaedic Surgery  [id number] [place] [id number]   [phone]     Chief Complaint:  Left Hip Pain    History of Present Illness:   Rolly Mcnair is a 60 year old female seen in clinic today as new for evaluation of their left hip. Symptoms have been present since November 2024.  Pain is located at the groin but patient also points to her lower abdomen. Pain is constant. It does not appear to worsen with weightbearing activities. Work up has been initiated by their PCP including referral to evaluate the hip as well as GI causes. They have an abdominal and pelvis ultrasound scheduled for this Friday. Both children are present and the son provides translation. A formal  via our language line service was offered but they declined.    Prior imaging studies have included XRs.   Treatment so far has consisted of Prednisone Rx'ed by their PCP but they have not started it yet    Activities of Daily Life:  Assistive devices used: none      PMH/PSH/Family History/Social History/Meds/Allergies:   Past Medical History:    Diabetes (HCC)    Essential hypertension   HIV    Past Surgical History:   Procedure Laterality Date    Hysterectomy         History reviewed. No pertinent family history.    Social History     Socioeconomic History    Marital status:      Spouse name: Not on file    Number of children: Not on file    Years of education: Not on file    Highest education level: Not on file   Occupational History    Not on file   Tobacco Use    Smoking status: Never    Smokeless tobacco: Never   Vaping Use    Vaping status: Never Used   Substance and Sexual Activity    Alcohol use: Never    Drug use: Never    Sexual activity: Not on file   Other Topics Concern    Not on file   Social History Narrative    Not on file     Social Drivers of Health     Food Insecurity: Not on file   Transportation Needs: Not on file   Stress: Not on file   Housing Stability: Not on file        Current  Outpatient Medications   Medication Instructions    atorvastatin (LIPITOR) 10 mg, Nightly    cyanocobalamin (VITAMIN B12) 1,000 mcg, Daily    dapagliflozin (FARXIGA) 5 mg, Daily    Efavirenz-lamiVUDine-Tenofovir 400-300-300 MG Oral Tab Nightly    ergocalciferol (VITAMIN D2) 50,000 Units, Weekly    metFORMIN (GLUCOPHAGE) 850 mg, 2 times daily with meals    Telmisartan-HCTZ 40-12.5 MG Oral Tab 1 tablet, Daily       Allergies[1]      Physical Exam:   Vitals:    03/03/25 1400   Weight: 139 lb (63 kg)   Height: 5' 2\" (1.575 m)     Estimated body mass index is 25.42 kg/m² as calculated from the following:    Height as of this encounter: 5' 2\" (1.575 m).    Weight as of this encounter: 139 lb (63 kg).    Constitutional: No acute distress, well nourished.  Eyes: Anicteric sclera, pink conjunctiva  Ears, Nose, Mouth and Throat: Normocephalic, atraumatic, moist mucous membranes  Cardiovascular: No pitting edema or varicosities in the lower extremities  Respiratory: No respiratory distress, normal respiratory rhythm and effort   Neurological:  Oriented to person, place, and time.  Psychological:  Appropriate mood and affect.      Comprehensive Left Hip Exam:      Gait: Normal  Inspection: No obvious limb length discrepancy  Palpation: Mild TTP greater trochanter  ROM: Flexion: 110 degrees  Internal Rotation: 20 degrees  External Rotation: 40 degrees  ROM causes pain?: Groin pain is not reproduced with hip ROM which she tolerates well  Strength: Intact 5/5 strength with IP, Quadriceps, TA, GS, and EHL  Sensation: Grossly intact to light touch over SPN/DPN/Tibial/Sural nerve distributions  Special tests: Stinchfield: painful; Trendelenberg: Negative  Vasc: Warm perfused extremity      Imaging:   XR AP Pelvis and 2 views AP and Lat of the Left Hip were personally reviewed and interpreted    There are mild degenerative changes of the hip with maintained joint space, small superior acetabular bone spur  No fracture or dislocation  noted        Assessment:     ICD-10-CM    1. Left hip pain  M25.552              Plan:   I discussed with patient and her family that while there may be a component of her pain coming from her hip, she reports more pain located in the left lower quadrant of her abdomen    I agree with her PCP and recommend continued GI and perhaps GYN work up for her pain. I discussed with family that this is not my area of expertise and I would defer further work up and care to her PCP and GI or GYN specialists    Discussed that if these evaluations are negative, we can consider obtaining an MRI of the Left Hip to evaluate for AVN as her antiretrovirals are a potential risk factor for this occurring. But there is no clear evidence of avascular necrosis on XR such as flattening of the femoral head. It also would not explain her LLQ pain.     Follow up with me if other work up is negative and we can consider doing a diagnostic injection vs MRI hip to better localize her pain    They expressed understanding. All questions were answered to their satisfaction      Thank you very much for allowing me to participate in the care of this patient. If you have any questions, please do not hesitate to contact me.      Irving Suresh MD  Adult Hip and Knee Reconstruction    Department of Orthopaedic Surgery  [facility]     [address] [place] [address]  [address] [place] [address]     t: [phone]  f: [phone]       [email or web address]         [1] No Known Allergies

## 2025-03-07 ENCOUNTER — HOSPITAL ENCOUNTER (OUTPATIENT)
Dept: ULTRASOUND IMAGING | Age: 60
Discharge: HOME | End: 2025-03-07
Attending: FAMILY MEDICINE
Payer: COMMERCIAL

## 2025-03-07 DIAGNOSIS — R10.32 LLQ ABDOMINAL PAIN: ICD-10-CM

## 2025-03-07 PROCEDURE — 76856 US EXAM PELVIC COMPLETE: CPT | Performed by: FAMILY MEDICINE

## 2025-03-08 ENCOUNTER — HOSPITAL ENCOUNTER (OUTPATIENT)
Dept: ULTRASOUND IMAGING | Facility: HOSPITAL | Age: 60
Discharge: HOME | End: 2025-03-08
Attending: FAMILY MEDICINE
Payer: COMMERCIAL

## 2025-03-08 DIAGNOSIS — R10.32 LLQ ABDOMINAL PAIN: ICD-10-CM

## 2025-03-08 PROCEDURE — 76700 US EXAM ABDOM COMPLETE: CPT | Performed by: FAMILY MEDICINE
